# Patient Record
Sex: MALE | Race: WHITE | NOT HISPANIC OR LATINO | Employment: FULL TIME | URBAN - NONMETROPOLITAN AREA
[De-identification: names, ages, dates, MRNs, and addresses within clinical notes are randomized per-mention and may not be internally consistent; named-entity substitution may affect disease eponyms.]

---

## 2017-02-14 ENCOUNTER — OFFICE VISIT (OUTPATIENT)
Dept: OTOLARYNGOLOGY | Facility: CLINIC | Age: 52
End: 2017-02-14

## 2017-02-14 VITALS
HEIGHT: 76 IN | SYSTOLIC BLOOD PRESSURE: 126 MMHG | BODY MASS INDEX: 38.36 KG/M2 | TEMPERATURE: 98 F | DIASTOLIC BLOOD PRESSURE: 80 MMHG | HEART RATE: 76 BPM | WEIGHT: 315 LBS

## 2017-02-14 DIAGNOSIS — I10 ESSENTIAL HYPERTENSION: ICD-10-CM

## 2017-02-14 DIAGNOSIS — E66.01 MORBID OBESITY DUE TO EXCESS CALORIES (HCC): ICD-10-CM

## 2017-02-14 DIAGNOSIS — C44.91 BCC (BASAL CELL CARCINOMA OF SKIN): Primary | ICD-10-CM

## 2017-02-14 PROCEDURE — 99213 OFFICE O/P EST LOW 20 MIN: CPT | Performed by: PHYSICIAN ASSISTANT

## 2017-02-14 NOTE — PROGRESS NOTES
YOB: 1965  Location: [facility]  Location Address: [address] [place] [address], [facility], [address] [place] [address]  Location Phone: [phone]    Chief Complaint   Patient presents with   • Follow-up     BCC left cheek       History of Present Illness  Nnamdi Ferguson is a 51 y.o. male.  Nnamdi Ferguson is here status post excision of basal cell carcinoma of the left lateral cheek/jawline with complex closure 10/26/16. Patient denies new lesions or complaints.          Tissue Exam   Order: [id number]   Status:  Final result   Visible to patient:  Yes (MyChart) Dx:  Basal cell carcinoma of cheek      3mo ago     Clinical Information       Pre-Op Diagnosis: Basal cell carcinoma of cheek.    Final Diagnosis   Skin of left cheek, excisional biopsy:   A. Basal cell carcinoma, nodular type.   B. Peripheral and deep margins are free of malignant infiltrate.      1   Electronically signed by Aaron Elkins MD on 10/27/2016 at 2306             Past Medical History   Diagnosis Date   • BCC (basal cell carcinoma of skin)      LEFT CHEEK   • Diabetes mellitus      STATES HE IS NOT A DIABETIC   • Hypertension    • Low testosterone        Past Surgical History   Procedure Laterality Date   • Laparoscopic gastric banding     • Appendectomy     • Hernia repair       X2   • Head/neck lesion/cyst excision Left 10/26/2016     Procedure: EXCISION BASAL CELL LEFT CHEEK W/ F.S.;  Surgeon: Sky Serrano MD;  Location: [facility] OR;  Service:          Current Outpatient Prescriptions:   •  aspirin 81 MG chewable tablet, Chew 81 mg Daily., Disp: , Rfl:   •  atenolol (TENORMIN) 50 MG tablet, Take 50 mg by mouth Every Night., Disp: , Rfl:   •  Biotin 5000 MCG capsule, Take 1 tablet by mouth Daily., Disp: , Rfl:   •  HORSE CHESTNUT PO, Take 400 mg by mouth Daily., Disp: , Rfl:   •  metFORMIN (GLUCOPHAGE) 500 MG tablet, Take 500 mg by mouth 2 (Two) Times a Day With Meals., Disp: , Rfl:   •  Multiple Vitamins-Minerals (ULTRA  NENA GOLD PO), Take 1 tablet by mouth Daily., Disp: , Rfl:   •  Saw Palmetto, Serenoa repens, (SAW PALMETTO PO), Take 1 tablet by mouth Daily., Disp: , Rfl:   •  TESTOSTERONE IM, Inject 200 mg into the shoulder, thigh, or buttocks Every 14 (Fourteen) Days., Disp: , Rfl:     Penicillins    History reviewed. No pertinent family history.    Social History     Social History   • Marital status:      Spouse name: N/A   • Number of children: N/A   • Years of education: N/A     Occupational History   • Not on file.     Social History Main Topics   • Smoking status: Never Smoker   • Smokeless tobacco: Not on file   • Alcohol use Yes      Comment: SOCIAL DRINKING   • Drug use: No   • Sexual activity: Defer     Other Topics Concern   • Not on file     Social History Narrative       Review of Systems   Constitutional: Negative.    HENT: Negative.    Eyes: Negative.    Respiratory: Negative.    Cardiovascular: Negative.    Gastrointestinal: Negative.    Endocrine: Negative.    Genitourinary: Negative.    Musculoskeletal: Negative.    Skin: Negative.    Allergic/Immunologic: Negative.    Neurological: Negative.    Hematological: Negative.    Psychiatric/Behavioral: Negative.        Vitals:    02/14/17 1125   BP: 126/80   Pulse: 76   Temp: 98 °F (36.7 °C)       Objective     Physical Exam  CONSTITUTIONAL: well nourished, alert, oriented, in no acute distress     COMMUNICATION AND VOICE: able to communicate normally, normal voice quality    HEAD: normocephalic, no lesions, atraumatic, no tenderness, no masses     FACE: appearance normal, no lesions, no tenderness, no deformities, facial motion symmetric, left cheek excision site well healed, no evidence of recurrence    EYES: ocular motility normal, eyelids normal, orbits normal, no proptosis, conjunctiva normal , pupils equal, round     EARS:  Hearing: response to conversational voice normal bilaterally   External Ears: auricles without lesions    NOSE:  External Nose:  structure normal, no tenderness on palpation, no nasal discharge, no lesions, no evidence of trauma, nostrils patent     ORAL:  Lips: upper and lower lips without lesion     NECK: neck appearance normal    CHEST/RESPIRATORY: respiratory effort normal, normal breath sounds     CARDIOVASCULAR: rate and rhythm normal, extremities without cyanosis or edema      NEUROLOGIC/PSYCHIATRIC: oriented to time, place and person, mood normal, affect appropriate, CN II-XII intact grossly    Assessment/Plan   Problems Addressed this Visit        Cardiovascular and Mediastinum    BP (high blood pressure)       Digestive    Extreme obesity       Musculoskeletal and Integument    BCC (basal cell carcinoma of skin) - Primary        * Surgery not found *  No orders of the defined types were placed in this encounter.    Return if symptoms worsen or fail to improve.       Patient Instructions   Protect the incisions from sunlight. Sunlight to the incisions will cause permanent pigmentation to the incision line and make the incision more noticeable. After the incision has reepithelialized, you may begin to use sunscreen with an SPF of 15 or greater    I discussed the use of  Vitamin E, Mederma, or a high-quality extra virgin olive oil to the incisions to optimize the end result. Apply topically twice daily, or as directed, to help optimize wound healing and decrease erythema.    Discussed the importance of routine skin checks with a dermatologist every 6-12 months.

## 2017-02-14 NOTE — PATIENT INSTRUCTIONS
Protect the incisions from sunlight. Sunlight to the incisions will cause permanent pigmentation to the incision line and make the incision more noticeable. After the incision has reepithelialized, you may begin to use sunscreen with an SPF of 15 or greater    I discussed the use of  Vitamin E, Mederma, or a high-quality extra virgin olive oil to the incisions to optimize the end result. Apply topically twice daily, or as directed, to help optimize wound healing and decrease erythema.    Discussed the importance of routine skin checks with a dermatologist every 6-12 months.

## 2020-09-06 ENCOUNTER — HOSPITAL ENCOUNTER (EMERGENCY)
Facility: HOSPITAL | Age: 55
Discharge: HOME | End: 2020-09-06
Admitting: FAMILY MEDICINE

## 2020-09-06 ENCOUNTER — APPOINTMENT (OUTPATIENT)
Dept: GENERAL RADIOLOGY | Facility: HOSPITAL | Age: 55
End: 2020-09-06

## 2020-09-06 VITALS
SYSTOLIC BLOOD PRESSURE: 149 MMHG | OXYGEN SATURATION: 93 % | TEMPERATURE: 97.9 F | BODY MASS INDEX: 38.36 KG/M2 | WEIGHT: 315 LBS | HEART RATE: 88 BPM | RESPIRATION RATE: 20 BRPM | HEIGHT: 76 IN | DIASTOLIC BLOOD PRESSURE: 84 MMHG

## 2020-09-06 DIAGNOSIS — S76.111A RUPTURE OF RIGHT QUADRICEPS TENDON, INITIAL ENCOUNTER: Primary | ICD-10-CM

## 2020-09-06 PROCEDURE — 99284 EMERGENCY DEPT VISIT MOD MDM: CPT

## 2020-09-06 PROCEDURE — 63710000001 ONDANSETRON ODT 4 MG TABLET DISPERSIBLE: Performed by: NURSE PRACTITIONER

## 2020-09-06 PROCEDURE — 96372 THER/PROPH/DIAG INJ SC/IM: CPT

## 2020-09-06 PROCEDURE — 25010000003 HYDROMORPHONE 1 MG/ML SOLUTION: Performed by: NURSE PRACTITIONER

## 2020-09-06 PROCEDURE — 73562 X-RAY EXAM OF KNEE 3: CPT

## 2020-09-06 RX ORDER — MONTELUKAST SODIUM 10 MG/1
10 TABLET ORAL NIGHTLY
COMMUNITY

## 2020-09-06 RX ORDER — LOSARTAN POTASSIUM 25 MG/1
25 TABLET ORAL DAILY
COMMUNITY

## 2020-09-06 RX ORDER — ONDANSETRON 4 MG/1
4 TABLET, ORALLY DISINTEGRATING ORAL ONCE
Status: COMPLETED | OUTPATIENT
Start: 2020-09-06 | End: 2020-09-06

## 2020-09-06 RX ORDER — OXYCODONE HYDROCHLORIDE AND ACETAMINOPHEN 5; 325 MG/1; MG/1
1 TABLET ORAL EVERY 6 HOURS PRN
Qty: 12 TABLET | Refills: 0 | Status: SHIPPED | OUTPATIENT
Start: 2020-09-06 | End: 2020-09-09

## 2020-09-06 RX ADMIN — ONDANSETRON 4 MG: 4 TABLET, ORALLY DISINTEGRATING ORAL at 15:51

## 2020-09-06 RX ADMIN — HYDROMORPHONE HYDROCHLORIDE 1 MG: 1 INJECTION, SOLUTION INTRAMUSCULAR; INTRAVENOUS; SUBCUTANEOUS at 15:51
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
## 2024-10-30 DIAGNOSIS — M25.562 PAIN IN JOINT OF LEFT KNEE: Primary | ICD-10-CM

## 2024-10-31 ENCOUNTER — OFFICE VISIT (OUTPATIENT)
Age: 59
End: 2024-10-31

## 2024-10-31 ENCOUNTER — TELEPHONE (OUTPATIENT)
Age: 59
End: 2024-10-31

## 2024-10-31 VITALS — HEIGHT: 75 IN | WEIGHT: 315 LBS | BODY MASS INDEX: 39.17 KG/M2

## 2024-10-31 DIAGNOSIS — M17.12 PRIMARY OSTEOARTHRITIS OF LEFT KNEE: Primary | ICD-10-CM

## 2024-10-31 RX ORDER — BUPIVACAINE HYDROCHLORIDE 2.5 MG/ML
2 INJECTION, SOLUTION INFILTRATION; PERINEURAL ONCE
Status: COMPLETED | OUTPATIENT
Start: 2024-10-31 | End: 2024-10-31

## 2024-10-31 RX ORDER — SEMAGLUTIDE 2.68 MG/ML
INJECTION, SOLUTION SUBCUTANEOUS
COMMUNITY
Start: 2024-10-09

## 2024-10-31 RX ORDER — TESTOSTERONE CYPIONATE 200 MG/ML
INJECTION, SOLUTION INTRAMUSCULAR
COMMUNITY
Start: 2024-10-01

## 2024-10-31 RX ORDER — INSULIN GLARGINE 100 [IU]/ML
INJECTION, SOLUTION SUBCUTANEOUS
COMMUNITY
Start: 2024-10-21

## 2024-10-31 RX ORDER — DAPAGLIFLOZIN 10 MG/1
10 TABLET, FILM COATED ORAL DAILY
COMMUNITY
Start: 2024-09-25

## 2024-10-31 RX ORDER — SALICYLIC ACID 2 %
1 CLEANSER (ML) TOPICAL 2 TIMES DAILY PRN
COMMUNITY
Start: 2024-10-11

## 2024-10-31 RX ORDER — MONTELUKAST SODIUM 10 MG/1
10 TABLET ORAL DAILY
COMMUNITY

## 2024-10-31 RX ORDER — PEN NEEDLE, DIABETIC 31 GX5/16"
NEEDLE, DISPOSABLE MISCELLANEOUS
COMMUNITY
Start: 2024-10-28

## 2024-10-31 RX ORDER — LIDOCAINE HYDROCHLORIDE 10 MG/ML
2 INJECTION, SOLUTION INFILTRATION; PERINEURAL ONCE
Status: COMPLETED | OUTPATIENT
Start: 2024-10-31 | End: 2024-10-31

## 2024-10-31 RX ORDER — ROSUVASTATIN CALCIUM 20 MG/1
20 TABLET, COATED ORAL DAILY
COMMUNITY
Start: 2024-10-05

## 2024-10-31 RX ORDER — TRIAMCINOLONE ACETONIDE 40 MG/ML
40 INJECTION, SUSPENSION INTRA-ARTICULAR; INTRAMUSCULAR ONCE
Status: COMPLETED | OUTPATIENT
Start: 2024-10-31 | End: 2024-10-31

## 2024-10-31 RX ORDER — FUROSEMIDE 80 MG/1
TABLET ORAL
COMMUNITY
Start: 2024-08-13

## 2024-10-31 RX ORDER — LOSARTAN POTASSIUM 100 MG/1
TABLET ORAL
COMMUNITY
Start: 2024-09-17

## 2024-10-31 RX ADMIN — BUPIVACAINE HYDROCHLORIDE 5 MG: 2.5 INJECTION, SOLUTION INFILTRATION; PERINEURAL at 09:49

## 2024-10-31 RX ADMIN — TRIAMCINOLONE ACETONIDE 40 MG: 40 INJECTION, SUSPENSION INTRA-ARTICULAR; INTRAMUSCULAR at 09:50

## 2024-10-31 RX ADMIN — LIDOCAINE HYDROCHLORIDE 2 ML: 10 INJECTION, SOLUTION INFILTRATION; PERINEURAL at 09:49

## 2024-10-31 NOTE — TELEPHONE ENCOUNTER
Dr. Shaffer states yes bone spurs can cause popping in the knee.     Called the patient and let him know of this. He stated understanding and appreciation for the call. No further questions at this time.

## 2024-10-31 NOTE — PROGRESS NOTES
[facility] Clinic Note - Established Patient    NAME:  German Mccord   : 1965  MRN: [id number]    10/31/2024    CHIEF COMPLAINT: Update for left knee pain    HISTORY OF PRESENT ILLNESS:   The patient is a 59 y.o. male who returns today for update of left knee pain.  He was previously seen in  by Shabbir Moody PA-C and received a corticosteroid injection at that time.  Since last visit, pain has returned without injury.  He reports increased pain over the last couple of months, worse with straightening his knee and walking downhill.  He denies swelling or known mechanical symptoms.   He has been using a TENS unit.    Past Medical History:    History reviewed. No pertinent past medical history.    Past Surgical History:    History reviewed. No pertinent surgical history.    Current Medications:   Prior to Admission medications    Medication Sig Start Date End Date Taking? Authorizing Provider   FARXIGA 10 MG tablet Take 1 tablet by mouth daily 24  Yes ProviderKamla MD   [facility] ALLERGY RELIEF D  MG per extended release tablet Take 1 tablet by mouth 2 times daily as needed 10/11/24  Yes ProviderKamla MD   furosemide (LASIX) 80 MG tablet  24  Yes Provider, MD BILL Cason 100 UNIT/ML injection pen INJECT 68 UNITS EVERY DAY BY SUBCUTANEOUS ROUTE. 10/21/24  Yes ProviderKamla MD B-D ULTRAFINE III SHORT PEN 31G X 8 MM MISC  10/28/24  Yes Provider, MD Kamla   losartan (COZAAR) 100 MG tablet  24  Yes Provider, MD Kamla   montelukast (SINGULAIR) 10 MG tablet Take 1 tablet by mouth daily   Yes Provider, MD Kamla   rosuvastatin (CRESTOR) 20 MG tablet Take 1 tablet by mouth daily 10/5/24  Yes Provider, MD Kamla   OZEMPIC, 2 MG/DOSE, 8 MG/3ML SOPN sc injection INJECT 2 MG INTO THE SKIN ONCE WEEKLY 10/9/24  Yes Kamla Manzo MD   testosterone cypionate (DEPOTESTOTERONE CYPIONATE) 200 MG/ML injection INJECT 1 ML

## 2025-02-10 ENCOUNTER — OFFICE VISIT (OUTPATIENT)
Age: 60
End: 2025-02-10
Payer: COMMERCIAL

## 2025-02-10 VITALS — HEIGHT: 75 IN | WEIGHT: 315 LBS | BODY MASS INDEX: 39.17 KG/M2

## 2025-02-10 DIAGNOSIS — L03.115 CELLULITIS OF RIGHT LOWER EXTREMITY: ICD-10-CM

## 2025-02-10 DIAGNOSIS — M17.12 PRIMARY OSTEOARTHRITIS OF LEFT KNEE: Primary | ICD-10-CM

## 2025-02-10 PROCEDURE — 20610 DRAIN/INJ JOINT/BURSA W/O US: CPT

## 2025-02-10 RX ORDER — SULFAMETHOXAZOLE AND TRIMETHOPRIM 800; 160 MG/1; MG/1
1 TABLET ORAL 2 TIMES DAILY
Qty: 14 TABLET | Refills: 0 | Status: SHIPPED | OUTPATIENT
Start: 2025-02-10 | End: 2025-02-17

## 2025-02-10 RX ORDER — TRIAMCINOLONE ACETONIDE 40 MG/ML
40 INJECTION, SUSPENSION INTRA-ARTICULAR; INTRAMUSCULAR ONCE
Status: COMPLETED | OUTPATIENT
Start: 2025-02-10 | End: 2025-02-10

## 2025-02-10 RX ORDER — BUPIVACAINE HYDROCHLORIDE 2.5 MG/ML
2 INJECTION, SOLUTION INFILTRATION; PERINEURAL ONCE
Status: COMPLETED | OUTPATIENT
Start: 2025-02-10 | End: 2025-02-10

## 2025-02-10 RX ORDER — LIDOCAINE HYDROCHLORIDE 10 MG/ML
2 INJECTION, SOLUTION INFILTRATION; PERINEURAL ONCE
Status: COMPLETED | OUTPATIENT
Start: 2025-02-10 | End: 2025-02-10

## 2025-02-10 RX ADMIN — BUPIVACAINE HYDROCHLORIDE 5 MG: 2.5 INJECTION, SOLUTION INFILTRATION; PERINEURAL at 09:24

## 2025-02-10 RX ADMIN — LIDOCAINE HYDROCHLORIDE 2 ML: 10 INJECTION, SOLUTION INFILTRATION; PERINEURAL at 09:24

## 2025-02-10 RX ADMIN — TRIAMCINOLONE ACETONIDE 40 MG: 40 INJECTION, SUSPENSION INTRA-ARTICULAR; INTRAMUSCULAR at 09:25
